# Patient Record
(demographics unavailable — no encounter records)

---

## 2025-01-02 NOTE — ASSESSMENT
[FreeTextEntry1] : 40-year-old male for evaluation status post left pinky finger injury.  Patient reports 5 days ago he was playing with his son when he jammed his left pinky finger.  He reports pain and swelling at the PIP joint since the time of injury along with some bruising.  Denies any numbness or tingling.  Physical examination of the left pinky finger: Moderate swelling and ecchymosis appreciated throughout.  No erythema is appreciated.  Skin is intact.  Tender to palpation over the PIP joint.  No malrotation or deviations appreciated.  Can flex and extend at the PIP joint.  No rotational deformities appreciated upon flexion.  Sensation is intact distally.  Neuro vas intact.  X-rays left pinky finger taken in the office today reveal an acute closed minimally displaced intra-articular fracture of the base of the middle phalanx of the left pinky finger.  No subluxations or dislocations.  TX: Patient has  an acute closed minimally displaced intra-articular fracture of the base of the middle phalanx of the left pinky finger.  X-rays were discussed in depth.  Operative versus nonoperative management was discussed.  Of note, he does not have any malrotation or deformity on examination.  The patient was placed in aluminum foam splint in the office today.  He is advised to wear the splint at all times at least until repeat evaluation.  Let the patient know that I will consult with our hand surgeon regarding operative versus nonoperative management.  I will have the patient follow-up in a week for repeat x-rays and further evaluation/treatment.  Red flag symptoms were discussed. All questions and concerns addressed to patient's satisfaction. Patient expresses full understanding of treatment plan.

## 2025-01-07 NOTE — HISTORY OF PRESENT ILLNESS
[de-identified] : 40-year-old male has a left little finger middle phalanx fracture.  Comes in today for evaluation.  A occurred playing sports.  And trauma to the area.  And comes in for the evaluation today.

## 2025-01-07 NOTE — DATA REVIEWED
[FreeTextEntry1] : Radiographs show a nondisplaced fracture of the left little finger middle phalanx.  The does appear to be an enchondroma at the base of the finger where the fracture occurred.

## 2025-01-07 NOTE — ASSESSMENT
[FreeTextEntry1] : Patient a left little finger middle phalanx fracture.  I believe it is a pathologic fracture.  It was explained to the patient.  He will see me back in a week with repeat radiographs splint off of his left little finger.  He can initiate a range of motion program.  Possibility of bone grafting in the future was discussed with the patient.

## 2025-01-07 NOTE — PHYSICAL EXAM
[de-identified] : Patient is in a splint.  Normal capillary refill.  Splint is in good condition.

## 2025-01-14 NOTE — DATA REVIEWED
[FreeTextEntry1] : Radiographs 3 views of the left little finger show good position alignment of the left little middle phalanx base fracture.

## 2025-01-14 NOTE — PHYSICAL EXAM
[de-identified] : Patient comes out of immobilization today.  Patient has stiffness.  Patient has resolving ecchymoses.  Mild swelling is present

## 2025-01-14 NOTE — ASSESSMENT
[FreeTextEntry1] : Patient is a left little finger middle phalanx base fracture.  Patient will initiate a range of motion exercise strengthening program.  He will see us back in 3 weeks with a repeat radiograph of the left little finger.  Once the fracture heals once his finger range of motion returns and his swelling goes down possibly at that point in time the enchondroma that I think is present can be bone grafted

## 2025-01-14 NOTE — HISTORY OF PRESENT ILLNESS
[de-identified] : 40-year-old male left little finger middle phalanx base fracture.  Comes in today for evaluation.  Spent about 3 weeks since injury.  No significant complaints.  Has been splinted since injury.